# Patient Record
Sex: FEMALE | Race: OTHER | URBAN - METROPOLITAN AREA
[De-identification: names, ages, dates, MRNs, and addresses within clinical notes are randomized per-mention and may not be internally consistent; named-entity substitution may affect disease eponyms.]

---

## 2024-09-06 ENCOUNTER — INPATIENT (INPATIENT)
Facility: HOSPITAL | Age: 79
LOS: 1 days | Discharge: HOME | DRG: 203 | End: 2024-09-08
Attending: STUDENT IN AN ORGANIZED HEALTH CARE EDUCATION/TRAINING PROGRAM | Admitting: STUDENT IN AN ORGANIZED HEALTH CARE EDUCATION/TRAINING PROGRAM
Payer: MEDICAID

## 2024-09-06 VITALS — HEIGHT: 60 IN | WEIGHT: 164.91 LBS

## 2024-09-06 DIAGNOSIS — R07.9 CHEST PAIN, UNSPECIFIED: ICD-10-CM

## 2024-09-06 DIAGNOSIS — Z98.890 OTHER SPECIFIED POSTPROCEDURAL STATES: Chronic | ICD-10-CM

## 2024-09-06 LAB
ALBUMIN SERPL ELPH-MCNC: 3.6 G/DL — SIGNIFICANT CHANGE UP (ref 3.3–5)
ALP SERPL-CCNC: 75 U/L — SIGNIFICANT CHANGE UP (ref 40–120)
ALT FLD-CCNC: 30 U/L — SIGNIFICANT CHANGE UP (ref 12–78)
ANION GAP SERPL CALC-SCNC: 9 MMOL/L — SIGNIFICANT CHANGE UP (ref 5–17)
AST SERPL-CCNC: 53 U/L — HIGH (ref 15–37)
BASOPHILS # BLD AUTO: 0 K/UL — SIGNIFICANT CHANGE UP (ref 0–0.2)
BASOPHILS NFR BLD AUTO: 0 % — SIGNIFICANT CHANGE UP (ref 0–2)
BILIRUB SERPL-MCNC: 0.7 MG/DL — SIGNIFICANT CHANGE UP (ref 0.2–1.2)
BUN SERPL-MCNC: 11 MG/DL — SIGNIFICANT CHANGE UP (ref 7–23)
CALCIUM SERPL-MCNC: 9.5 MG/DL — SIGNIFICANT CHANGE UP (ref 8.5–10.1)
CHLORIDE SERPL-SCNC: 105 MMOL/L — SIGNIFICANT CHANGE UP (ref 96–108)
CO2 SERPL-SCNC: 20 MMOL/L — LOW (ref 22–31)
CREAT SERPL-MCNC: 0.74 MG/DL — SIGNIFICANT CHANGE UP (ref 0.5–1.3)
EGFR: 83 ML/MIN/1.73M2 — SIGNIFICANT CHANGE UP
EOSINOPHIL # BLD AUTO: 0.25 K/UL — SIGNIFICANT CHANGE UP (ref 0–0.5)
EOSINOPHIL NFR BLD AUTO: 2 % — SIGNIFICANT CHANGE UP (ref 0–6)
GLUCOSE SERPL-MCNC: 145 MG/DL — HIGH (ref 70–99)
HCT VFR BLD CALC: 39.7 % — SIGNIFICANT CHANGE UP (ref 34.5–45)
HGB BLD-MCNC: 12.7 G/DL — SIGNIFICANT CHANGE UP (ref 11.5–15.5)
LIDOCAIN IGE QN: 27 U/L — SIGNIFICANT CHANGE UP (ref 13–75)
LYMPHOCYTES # BLD AUTO: 37 % — SIGNIFICANT CHANGE UP (ref 13–44)
LYMPHOCYTES # BLD AUTO: 4.58 K/UL — HIGH (ref 1–3.3)
MAGNESIUM SERPL-MCNC: 2.1 MG/DL — SIGNIFICANT CHANGE UP (ref 1.6–2.6)
MANUAL SMEAR VERIFICATION: SIGNIFICANT CHANGE UP
MCHC RBC-ENTMCNC: 26.7 PG — LOW (ref 27–34)
MCHC RBC-ENTMCNC: 32 GM/DL — SIGNIFICANT CHANGE UP (ref 32–36)
MCV RBC AUTO: 83.4 FL — SIGNIFICANT CHANGE UP (ref 80–100)
MONOCYTES # BLD AUTO: 0.87 K/UL — SIGNIFICANT CHANGE UP (ref 0–0.9)
MONOCYTES NFR BLD AUTO: 7 % — SIGNIFICANT CHANGE UP (ref 2–14)
NEUTROPHILS # BLD AUTO: 6.68 K/UL — SIGNIFICANT CHANGE UP (ref 1.8–7.4)
NEUTROPHILS NFR BLD AUTO: 53 % — SIGNIFICANT CHANGE UP (ref 43–77)
NEUTS BAND # BLD: 1 % — SIGNIFICANT CHANGE UP (ref 0–8)
NRBC # BLD: 0 /100 WBCS — SIGNIFICANT CHANGE UP (ref 0–0)
NRBC # BLD: SIGNIFICANT CHANGE UP /100 WBCS (ref 0–0)
PLAT MORPH BLD: NORMAL — SIGNIFICANT CHANGE UP
PLATELET # BLD AUTO: 469 K/UL — HIGH (ref 150–400)
POTASSIUM SERPL-MCNC: 5 MMOL/L — SIGNIFICANT CHANGE UP (ref 3.5–5.3)
POTASSIUM SERPL-SCNC: 5 MMOL/L — SIGNIFICANT CHANGE UP (ref 3.5–5.3)
PROT SERPL-MCNC: 8.2 GM/DL — SIGNIFICANT CHANGE UP (ref 6–8.3)
RBC # BLD: 4.76 M/UL — SIGNIFICANT CHANGE UP (ref 3.8–5.2)
RBC # FLD: 13.6 % — SIGNIFICANT CHANGE UP (ref 10.3–14.5)
RBC BLD AUTO: NORMAL — SIGNIFICANT CHANGE UP
SODIUM SERPL-SCNC: 134 MMOL/L — LOW (ref 135–145)
TROPONIN I, HIGH SENSITIVITY RESULT: 77.63 NG/L — HIGH
TROPONIN I, HIGH SENSITIVITY RESULT: 91.9 NG/L — HIGH
WBC # BLD: 12.37 K/UL — HIGH (ref 3.8–10.5)
WBC # FLD AUTO: 12.37 K/UL — HIGH (ref 3.8–10.5)

## 2024-09-06 PROCEDURE — 80061 LIPID PANEL: CPT

## 2024-09-06 PROCEDURE — 93010 ELECTROCARDIOGRAM REPORT: CPT

## 2024-09-06 PROCEDURE — 93005 ELECTROCARDIOGRAM TRACING: CPT

## 2024-09-06 PROCEDURE — 84484 ASSAY OF TROPONIN QUANT: CPT

## 2024-09-06 PROCEDURE — 99285 EMERGENCY DEPT VISIT HI MDM: CPT

## 2024-09-06 PROCEDURE — 93306 TTE W/DOPPLER COMPLETE: CPT

## 2024-09-06 PROCEDURE — 85027 COMPLETE CBC AUTOMATED: CPT

## 2024-09-06 PROCEDURE — 76376 3D RENDER W/INTRP POSTPROCES: CPT

## 2024-09-06 PROCEDURE — 80048 BASIC METABOLIC PNL TOTAL CA: CPT

## 2024-09-06 PROCEDURE — 36415 COLL VENOUS BLD VENIPUNCTURE: CPT

## 2024-09-06 PROCEDURE — 71046 X-RAY EXAM CHEST 2 VIEWS: CPT | Mod: 26

## 2024-09-06 PROCEDURE — 83036 HEMOGLOBIN GLYCOSYLATED A1C: CPT

## 2024-09-06 PROCEDURE — 71046 X-RAY EXAM CHEST 2 VIEWS: CPT

## 2024-09-06 RX ORDER — FAMOTIDINE 10 MG/ML
20 INJECTION INTRAVENOUS ONCE
Refills: 0 | Status: COMPLETED | OUTPATIENT
Start: 2024-09-06 | End: 2024-09-06

## 2024-09-06 RX ORDER — METFORMIN HYDROCHLORIDE 850 MG/1
1 TABLET, FILM COATED ORAL
Refills: 0 | DISCHARGE

## 2024-09-06 RX ORDER — MAGNESIUM, ALUMINUM HYDROXIDE 200-225/5
30 SUSPENSION, ORAL (FINAL DOSE FORM) ORAL ONCE
Refills: 0 | Status: COMPLETED | OUTPATIENT
Start: 2024-09-06 | End: 2024-09-06

## 2024-09-06 RX ADMIN — Medication 30 MILLILITER(S): at 21:11

## 2024-09-06 RX ADMIN — FAMOTIDINE 20 MILLIGRAM(S): 10 INJECTION INTRAVENOUS at 21:11

## 2024-09-06 NOTE — ED STATDOCS - PROGRESS NOTE DETAILS
Labs and imaging reviewed. Troponin 91. No other actionable findings. CXR NAD. Patient and family at bedside made aware. Will admit to telemetry. Patient agreeable to admission. - Lis Rivera PA-C Troponin 91. No other actionable findings.  Patient and family at bedside made aware. Will admit to telemetry. Patient agreeable to admission. - Lis Rivera PA-C

## 2024-09-06 NOTE — ED ADULT TRIAGE NOTE - CHIEF COMPLAINT QUOTE
Pt ambulatory to ER c/o upper abdominal pain/chest pain, dizziness x 1 month. Pt states she feels a "burning sensation" to chest. Pt also endorses right hip pain for 1 month and vomiting today x2. Denies any other complaints at this time. No acute distress noted. As per patient, no PMH. NKDA. A&Ox4. BEFAST negative. Pt taken for STAT EKG.

## 2024-09-06 NOTE — ED STATDOCS - OBJECTIVE STATEMENT
80 yo female w/PMHx DM, HTN, HLD, hernia, presents to the ED with multiple medical complaints. Pt in ED c/o burning abdominal pain x1 month. Per chart review, was seen at [facility] in August for similar complaints. Has been taking Acetominophen for pain.   [id number] [id number]    used, id# [id number] 80 yo female w/PMHx DM, HTN, HLD, hernia, presents to the ED with multiple medical complaints. Pt in ED c/o burning abdominal pain radiating up into her chest x1 month. Per chart review, was seen at [facility] in August for similar complaints. Has been taking Acetominophen for pain. Pt in ED also endorsing n/v x2 days, and states she is having back pain as well. No saddle anesthesia reported.  used, id# [id number] 78 yo female w/PMHx DM, HTN, HLD, hernia, presents to the ED with multiple medical complaints. Pt in ED c/o burning abdominal pain radiating up into her chest x1 month. Per chart review, was seen at [facility] in August for similar complaints, and was dx with gastritis. Has been taking Acetominophen for pain. Pt in ED also endorsing n/v x2 days, and states she is having back pain as well. No saddle anesthesia reported.  used, id# [id number]

## 2024-09-06 NOTE — ED STATDOCS - NSICDXPASTMEDICALHX_GEN_ALL_CORE_FT
PAST MEDICAL HISTORY:  DM (diabetes mellitus)     Hernia     HLD (hyperlipidemia)     HTN (hypertension)

## 2024-09-06 NOTE — ED STATDOCS - PHYSICAL EXAMINATION
GEN: Patient awake alert NAD.   HEENT: normocephalic, atraumatic, EOMI, no scleral icterus, moist MM  CARDIAC: RRR, S1, S2, no murmur.   PULM: CTA B/L no wheeze, rhonchi, rales.   ABD: soft NT, ND, no rebound no guarding, no CVA tenderness.   MSK: Moving all extremities, no edema. 5/5 strength and full ROM in all extremities.     NEURO: A&Ox3, gait normal, no focal neurological deficits, CN 2-12 grossly intact  SKIN: warm, dry, no rash. 3C-19A/Telemetry GEN: Patient awake alert NAD.   HEENT: normocephalic, atraumatic, EOMI, no scleral icterus, moist MM  CARDIAC: RRR, S1, S2, no murmur.   PULM: CTA B/L no wheeze, rhonchi, rales.   ABD: soft, epigastric ttp, ND, no rebound no guarding, no CVA tenderness.   MSK: Moving all extremities, no edema. 5/5 strength and full ROM in all extremities.     NEURO: A&Ox3, gait normal, no focal neurological deficits,  SKIN: warm, dry, no rash.

## 2024-09-06 NOTE — PHARMACOTHERAPY INTERVENTION NOTE - COMMENTS
Medication history complete, reviewed medications with patient son  and confirmed with doctor first med hx.

## 2024-09-06 NOTE — ED STATDOCS - NSICDXFAMILYHX_GEN_ALL_CORE_FT
FAMILY HISTORY:  Father  Still living? No  FHx: ischemic heart disease, Age at diagnosis: Age Unknown

## 2024-09-06 NOTE — ED STATDOCS - CLINICAL SUMMARY MEDICAL DECISION MAKING FREE TEXT BOX
Rowdy Harrington DO (Attending): 78 yo female w/PMHx DM, HTN, HLD, hernia, presents to the ED with multiple medical complaints. Pt in ED c/o burning abdominal pain radiating up into her chest x1 month. Per chart review, was seen at [facility] in August for similar complaints, and was dx with gastritis. Has been taking Acetominophen for pain. Pt in ED also endorsing n/v x2 days, and states she is having back pain as well. No saddle anesthesia reported. Patient well-appearing, hemodynamically stable, epigastric tenderness.  Differential clues not limited to GERD, doubt acute biliary pathology, pancreatitis, low suspicion for atypical ACS.  Plan for labs, EKG, telemetry, chest x-ray, pain control.  Reassess

## 2024-09-07 LAB
A1C WITH ESTIMATED AVERAGE GLUCOSE RESULT: 7.8 % — HIGH (ref 4–5.6)
ANION GAP SERPL CALC-SCNC: 8 MMOL/L — SIGNIFICANT CHANGE UP (ref 5–17)
BUN SERPL-MCNC: 9 MG/DL — SIGNIFICANT CHANGE UP (ref 7–23)
CALCIUM SERPL-MCNC: 9 MG/DL — SIGNIFICANT CHANGE UP (ref 8.5–10.1)
CHLORIDE SERPL-SCNC: 106 MMOL/L — SIGNIFICANT CHANGE UP (ref 96–108)
CHOLEST SERPL-MCNC: 283 MG/DL — HIGH
CO2 SERPL-SCNC: 23 MMOL/L — SIGNIFICANT CHANGE UP (ref 22–31)
CREAT SERPL-MCNC: 0.62 MG/DL — SIGNIFICANT CHANGE UP (ref 0.5–1.3)
EGFR: 92 ML/MIN/1.73M2 — SIGNIFICANT CHANGE UP
ESTIMATED AVERAGE GLUCOSE: 177 MG/DL — HIGH (ref 68–114)
GLUCOSE SERPL-MCNC: 144 MG/DL — HIGH (ref 70–99)
HCT VFR BLD CALC: 37.1 % — SIGNIFICANT CHANGE UP (ref 34.5–45)
HDLC SERPL-MCNC: 44 MG/DL — LOW
HGB BLD-MCNC: 12 G/DL — SIGNIFICANT CHANGE UP (ref 11.5–15.5)
LIPID PNL WITH DIRECT LDL SERPL: 195 MG/DL — HIGH
MCHC RBC-ENTMCNC: 27 PG — SIGNIFICANT CHANGE UP (ref 27–34)
MCHC RBC-ENTMCNC: 32.3 GM/DL — SIGNIFICANT CHANGE UP (ref 32–36)
MCV RBC AUTO: 83.4 FL — SIGNIFICANT CHANGE UP (ref 80–100)
NON HDL CHOLESTEROL: 240 MG/DL — HIGH
PLATELET # BLD AUTO: 433 K/UL — HIGH (ref 150–400)
POTASSIUM SERPL-MCNC: 3.7 MMOL/L — SIGNIFICANT CHANGE UP (ref 3.5–5.3)
POTASSIUM SERPL-SCNC: 3.7 MMOL/L — SIGNIFICANT CHANGE UP (ref 3.5–5.3)
RBC # BLD: 4.45 M/UL — SIGNIFICANT CHANGE UP (ref 3.8–5.2)
RBC # FLD: 13.6 % — SIGNIFICANT CHANGE UP (ref 10.3–14.5)
SODIUM SERPL-SCNC: 137 MMOL/L — SIGNIFICANT CHANGE UP (ref 135–145)
TRIGL SERPL-MCNC: 231 MG/DL — HIGH
WBC # BLD: 9.48 K/UL — SIGNIFICANT CHANGE UP (ref 3.8–10.5)
WBC # FLD AUTO: 9.48 K/UL — SIGNIFICANT CHANGE UP (ref 3.8–10.5)

## 2024-09-07 PROCEDURE — 99223 1ST HOSP IP/OBS HIGH 75: CPT

## 2024-09-07 PROCEDURE — 93306 TTE W/DOPPLER COMPLETE: CPT | Mod: 26

## 2024-09-07 PROCEDURE — 76376 3D RENDER W/INTRP POSTPROCES: CPT | Mod: 26

## 2024-09-07 PROCEDURE — 93010 ELECTROCARDIOGRAM REPORT: CPT

## 2024-09-07 RX ORDER — MAGNESIUM, ALUMINUM HYDROXIDE 200-225/5
30 SUSPENSION, ORAL (FINAL DOSE FORM) ORAL EVERY 4 HOURS
Refills: 0 | Status: DISCONTINUED | OUTPATIENT
Start: 2024-09-07 | End: 2024-09-08

## 2024-09-07 RX ORDER — ENOXAPARIN SODIUM 100 MG/ML
40 INJECTION SUBCUTANEOUS EVERY 24 HOURS
Refills: 0 | Status: DISCONTINUED | OUTPATIENT
Start: 2024-09-07 | End: 2024-09-08

## 2024-09-07 RX ORDER — METFORMIN HYDROCHLORIDE 850 MG/1
500 TABLET, FILM COATED ORAL
Refills: 0 | Status: DISCONTINUED | OUTPATIENT
Start: 2024-09-07 | End: 2024-09-08

## 2024-09-07 RX ORDER — ONDANSETRON 2 MG/ML
4 INJECTION, SOLUTION INTRAMUSCULAR; INTRAVENOUS EVERY 8 HOURS
Refills: 0 | Status: DISCONTINUED | OUTPATIENT
Start: 2024-09-07 | End: 2024-09-08

## 2024-09-07 RX ORDER — KETOROLAC TROMETHAMINE 30 MG/ML
15 INJECTION, SOLUTION INTRAMUSCULAR EVERY 8 HOURS
Refills: 0 | Status: DISCONTINUED | OUTPATIENT
Start: 2024-09-07 | End: 2024-09-08

## 2024-09-07 RX ORDER — ACETAMINOPHEN 325 MG/1
650 TABLET ORAL EVERY 6 HOURS
Refills: 0 | Status: DISCONTINUED | OUTPATIENT
Start: 2024-09-07 | End: 2024-09-08

## 2024-09-07 RX ORDER — PANTOPRAZOLE SODIUM 40 MG
40 TABLET, DELAYED RELEASE (ENTERIC COATED) ORAL
Refills: 0 | Status: DISCONTINUED | OUTPATIENT
Start: 2024-09-07 | End: 2024-09-08

## 2024-09-07 RX ORDER — LOSARTAN POTASSIUM 50 MG/1
50 TABLET ORAL DAILY
Refills: 0 | Status: DISCONTINUED | OUTPATIENT
Start: 2024-09-07 | End: 2024-09-08

## 2024-09-07 RX ADMIN — Medication 40 MILLIGRAM(S): at 06:15

## 2024-09-07 RX ADMIN — ENOXAPARIN SODIUM 40 MILLIGRAM(S): 100 INJECTION SUBCUTANEOUS at 06:15

## 2024-09-07 RX ADMIN — KETOROLAC TROMETHAMINE 15 MILLIGRAM(S): 30 INJECTION, SOLUTION INTRAMUSCULAR at 21:29

## 2024-09-07 RX ADMIN — METFORMIN HYDROCHLORIDE 500 MILLIGRAM(S): 850 TABLET, FILM COATED ORAL at 21:29

## 2024-09-07 RX ADMIN — METFORMIN HYDROCHLORIDE 500 MILLIGRAM(S): 850 TABLET, FILM COATED ORAL at 10:00

## 2024-09-07 RX ADMIN — KETOROLAC TROMETHAMINE 15 MILLIGRAM(S): 30 INJECTION, SOLUTION INTRAMUSCULAR at 16:48

## 2024-09-07 RX ADMIN — LOSARTAN POTASSIUM 50 MILLIGRAM(S): 50 TABLET ORAL at 10:00

## 2024-09-07 NOTE — PROGRESS NOTE ADULT - ASSESSMENT
80 y/o female presents with chest pain:    #Chest pain likely GI  EKG wnl  Trop x2 negative  MAALOX given in ED which relieved the pain  patient has indomethacin in chart but is not currently taking it, likely was prescribed for gout but patient can't recall  Patient does not smoke or drink ETOH  Unlikely to be cardiac in nature but patient has risk factors  f/u TTE  f/u lipid panel  c/w protonix     #Hx of HTN  c/w home meds    #Hx of DM  c/w home meds  f/u a1c    DVT: Lovenox  Disposition: Discharge tomorrow if symptoms stable

## 2024-09-07 NOTE — H&P ADULT - ASSESSMENT
78 y/o female presents with chest pain:    #Chest pain likely GI  EKG wnl  Trop x2 negative  MAALOX given in ED which relieved the pain  patient has indomethacin in chart but is not currently taking it, likely was prescribed for gout but patient can't recall  Patient does not smoke or drink ETOH  Unlikely to be cardiac in nature but patient has risk factors  f/u TTE  f/u lipid panel  c/w protonix     #Hx of HTN  c/w home meds    #Hx of DM  c/w home meds  f/u a1c    DVT: Lovenox  Disposition: Home within 48 hours

## 2024-09-07 NOTE — H&P ADULT - HISTORY OF PRESENT ILLNESS
78 y/o female with a PMHx of HTN and DM presents to the ED with chest pain. She deferred HPI to her son at bedside. Son states that she has been feeling chest pressure on and off for 3 weeks. She went to the hospital during that time and was diagnosed with gastritis, cardio workup was wnl as per son. She is unable to associate the pain with any factors and nothing relieves the pain. She also has SOB during these times but no ANGUIANO or orthopnea. Denies any n/v/d/c, headache, focal neurological deficits.

## 2024-09-07 NOTE — PROGRESS NOTE ADULT - SUBJECTIVE AND OBJECTIVE BOX
HOSPITALIST PROGRESS NOTE    SUBJECTIVE / INTERVAL HPI: Patient seen and examined at bedside.  used. Patient still with chest pain - similar to admission. No SOB/palpitations     ROS: All 10 systems reviewed and found to be negative with the exception of what has been described above.     VITAL SIGNS:  Vital Signs Last 24 Hrs  T(C): 37 (07 Sep 2024 15:34), Max: 37 (06 Sep 2024 19:21)  T(F): 98.6 (07 Sep 2024 15:34), Max: 98.6 (06 Sep 2024 19:21)  HR: 89 (07 Sep 2024 15:34) (77 - 107)  BP: 121/57 (07 Sep 2024 15:34) (112/77 - 149/52)  BP(mean): 73 (06 Sep 2024 23:45) (73 - 101)  RR: 18 (07 Sep 2024 15:34) (16 - 18)  SpO2: 93% (07 Sep 2024 15:34) (92% - 96%)    Parameters below as of 07 Sep 2024 15:34  Patient On (Oxygen Delivery Method): room air        PHYSICAL EXAM:    General: No acute distress  HEENT: NC/AT; PERRL, anicteric sclera; MMM  Neck: Supple  Cardiovascular: +S1/S2, RRR, no murmurs, rubs, gallops  Respiratory: CTA B/L; no W/R/R  Gastrointestinal: soft, NT/ND; +BSx4  MSK: TTP around chest wall  Extremities: WWP; no edema, clubbing or cyanosis  Vascular: 2+ radial, DP/PT pulses B/L  Neurological: AAOx3; no focal deficits    MEDICATIONS:  MEDICATIONS  (STANDING):  enoxaparin Injectable 40 milliGRAM(s) SubCutaneous every 24 hours  losartan 50 milliGRAM(s) Oral daily  metFORMIN 500 milliGRAM(s) Oral two times a day  pantoprazole    Tablet 40 milliGRAM(s) Oral before breakfast    MEDICATIONS  (PRN):  acetaminophen     Tablet .. 650 milliGRAM(s) Oral every 6 hours PRN Temp greater or equal to 38C (100.4F), Mild Pain (1 - 3)  aluminum hydroxide/magnesium hydroxide/simethicone Suspension 30 milliLiter(s) Oral every 4 hours PRN Dyspepsia  melatonin 3 milliGRAM(s) Oral at bedtime PRN Insomnia  ondansetron Injectable 4 milliGRAM(s) IV Push every 8 hours PRN Nausea and/or Vomiting      ALLERGIES:  Allergies    No Known Allergies    Intolerances        LABS:                        12.0   9.48  )-----------( 433      ( 07 Sep 2024 07:59 )             37.1     09-07    137  |  106  |  9   ----------------------------<  144<H>  3.7   |  23  |  0.62    Ca    9.0      07 Sep 2024 07:59  Mg     2.1     09-06    TPro  8.2  /  Alb  3.6  /  TBili  0.7  /  DBili  x   /  AST  53<H>  /  ALT  30  /  AlkPhos  75  09-06      Urinalysis Basic - ( 07 Sep 2024 07:59 )    Color: x / Appearance: x / SG: x / pH: x  Gluc: 144 mg/dL / Ketone: x  / Bili: x / Urobili: x   Blood: x / Protein: x / Nitrite: x   Leuk Esterase: x / RBC: x / WBC x   Sq Epi: x / Non Sq Epi: x / Bacteria: x      CAPILLARY BLOOD GLUCOSE            RADIOLOGY & ADDITIONAL TESTS: Reviewed.

## 2024-09-07 NOTE — PATIENT PROFILE ADULT - ARE SIGNIFICANT INDICATORS COMPLETE.
hx, exam, I & D performed, augmentin/bactrim ds prescribed pt is advised to follow up with pmd and return if symptoms persist or worsen. Yes

## 2024-09-08 VITALS
RESPIRATION RATE: 18 BRPM | SYSTOLIC BLOOD PRESSURE: 123 MMHG | TEMPERATURE: 98 F | HEART RATE: 69 BPM | OXYGEN SATURATION: 98 % | DIASTOLIC BLOOD PRESSURE: 68 MMHG

## 2024-09-08 PROCEDURE — 99239 HOSP IP/OBS DSCHRG MGMT >30: CPT

## 2024-09-08 RX ORDER — IBUPROFEN 600 MG
1 TABLET ORAL
Qty: 42 | Refills: 0
Start: 2024-09-08 | End: 2024-09-21

## 2024-09-08 RX ORDER — PANTOPRAZOLE SODIUM 40 MG
1 TABLET, DELAYED RELEASE (ENTERIC COATED) ORAL
Qty: 30 | Refills: 0
Start: 2024-09-08 | End: 2024-10-07

## 2024-09-08 RX ADMIN — ENOXAPARIN SODIUM 40 MILLIGRAM(S): 100 INJECTION SUBCUTANEOUS at 06:03

## 2024-09-08 RX ADMIN — METFORMIN HYDROCHLORIDE 500 MILLIGRAM(S): 850 TABLET, FILM COATED ORAL at 09:05

## 2024-09-08 RX ADMIN — Medication 40 MILLIGRAM(S): at 06:03

## 2024-09-08 RX ADMIN — LOSARTAN POTASSIUM 50 MILLIGRAM(S): 50 TABLET ORAL at 09:04

## 2024-09-08 RX ADMIN — KETOROLAC TROMETHAMINE 15 MILLIGRAM(S): 30 INJECTION, SOLUTION INTRAMUSCULAR at 06:03

## 2024-09-08 NOTE — DISCHARGE NOTE PROVIDER - NSDCCPTREATMENT_GEN_ALL_CORE_FT
PRINCIPAL PROCEDURE  Procedure: Transthoracic echocardiography (TTE)  Findings and Treatment: Left Ventricle:  Left ventricular wall thickness is normal. Left ventricular systolic function is normal with a calculated ejection fraction of 55 % by 3D with an ejection fraction visually estimated at 55 to 60%. There is normal left ventriculardiastolic function.     Right Ventricle:  The right ventricular cavity is normal in size, with normal wall thickness and right ventricular systolic function is normal. Tricuspid annular plane systolic excursion (TAPSE) is 1.7 cm (normal >=1.7 cm).     Left Atrium:  The left atrium is normal in size with an indexed volume of 22.62 ml/m².     Right Atrium:  The right atrium is normal in size.     Interatrial Septum:  The interatrial septum appears intact.     Aortic Valve:  The aortic valve appearstrileaflet with normal systolic excursion. There is fibrocalcific aortic valve sclerosis without stenosis. The peak transaortic velocity is 1.30 m/s and peak transaortic gradient is 6.8 mmHg.     Mitral Valve:  There is mitral valve thickening of theanterior and posterior leaflets. There is calcification of the mitral valve annulus. There is no mitral valve stenosis. There is mild mitral regurgitation. Mitral inflow is A-wave dominant.     Tricuspid Valve:  Structurally normal tricuspid valve with normal leaflet excursion. There is trace tricuspid regurgitation.     Pulmonic Valve:  Structurally normal pulmonic valve with normal leaflet excursion. There is no evidence of pulmonic regurgitation.     Aorta:  The aortic root at the sinuses of Valsalva is normal in size, measuring 2.80 cm (indexed 1.63 cm/m²). The ascending aorta is normal in size, measuring 2.89 cm (indexed 1.68 cm/m²).     Pericardium:  No pericardial effusion seen

## 2024-09-08 NOTE — DISCHARGE NOTE PROVIDER - NSDCMRMEDTOKEN_GEN_ALL_CORE_FT
losartan 50 mg oral tablet: 1 tab(s) orally once a day  metFORMIN 500 mg oral tablet: 1 tab(s) orally 2 times a day  Multiple Vitamins oral tablet: 1 tab(s) orally once a day   atorvastatin 40 mg oral tablet: 1 tab(s) orally once a day (at bedtime)  ibuprofen 600 mg oral tablet: 1 tab(s) orally every 8 hours as needed for  moderate pain  losartan 50 mg oral tablet: 1 tab(s) orally once a day  metFORMIN 500 mg oral tablet: 1 tab(s) orally 2 times a day  Multiple Vitamins oral tablet: 1 tab(s) orally once a day  pantoprazole 40 mg oral delayed release tablet: 1 tab(s) orally once a day (before a meal)

## 2024-09-08 NOTE — DISCHARGE NOTE PROVIDER - HOSPITAL COURSE
#Discharge: do not delete    Patient is __ yo M/F with past medical history of _____ presented with _____, found to have _____ (one liner)    Hospital course (by problem):     Patient was discharged to: (home/REFUGIO/acute rehab/hospice, etc, and with what services – home health PT/RN? Home O2?)    New medications:   Changes to old medications:  Medications that were stopped:    Items to follow up as outpatient:    Physical exam at the time of discharge:       Admission HPI: 78 y/o female with a PMHx of HTN and DM presents to the ED with chest pain. She deferred HPI to her son at bedside. Son states that she has been feeling chest pressure on and off for 3 weeks. She went to the hospital during that time and was diagnosed with gastritis, cardio workup was wnl as per son. She is unable to associate the pain with any factors and nothing relieves the pain. She also has SOB during these times but no ANGUIANO or orthopnea. Denies any n/v/d/c, headache, focal neurological deficits.     9/8: Patient seen and examined. Reports CP is better with Toradol. Still mildly tender to palpitation. Stable for d/c home.     Hospital course (by problem):   #Chest pain likely GI vs MSK  EKG wnl  Trop x2 mildly elevated, could be in setting of tachycardia on admission from pain  MAALOX given in ED which relieved the pain  Toradol started which also helped with pain   Patient does not smoke or drink ETOH  TTE no acute findings  C/w Protonix   C/w Ibuprofen/Tylenol as needed  Outpatient Cardiology routine follow up    #HLD  Start statin, lifestyle modifications     #Hx of HTN  c/w home meds    #Hx of DM  c/w home meds  a1c 7.8    Patient was discharged to: Home   Physical exam at the time of discharge:  LOS: 2d    VITALS:   T(C): 36.8 (09-08-24 @ 08:42), Max: 37 (09-07-24 @ 15:34)  HR: 69 (09-08-24 @ 08:42) (69 - 99)  BP: 123/68 (09-08-24 @ 08:42) (106/58 - 127/60)  RR: 18 (09-08-24 @ 08:42) (18 - 18)  SpO2: 98% (09-08-24 @ 08:42) (92% - 98%)    PHYSICAL EXAM:  General: No acute distress  HEENT: NC/AT; PERRL, anicteric sclera; MMM  Neck: Supple  Cardiovascular: +S1/S2, RRR, no murmurs, rubs, gallops  Respiratory: CTA B/L; no W/R/R  Gastrointestinal: soft, NT/ND; +BSx4  MSK: TTP around chest wall  Extremities: WWP; no edema, clubbing or cyanosis  Vascular: 2+ radial, DP/PT pulses B/L  Neurological: AAOx3; no focal deficits

## 2024-09-08 NOTE — DISCHARGE NOTE NURSING/CASE MANAGEMENT/SOCIAL WORK - PATIENT PORTAL LINK FT
You can access the FollowMyHealth Patient Portal offered by [facility] by registering at the following website: [email or web address]. By joining [employer]’s FollowMyHealth portal, you will also be able to view your health information using other applications (apps) compatible with our system.

## 2024-09-08 NOTE — DISCHARGE NOTE PROVIDER - NSDCCPCAREPLAN_GEN_ALL_CORE_FT
PRINCIPAL DISCHARGE DIAGNOSIS  Diagnosis: Acute chest pain  Assessment and Plan of Treatment: Your chest pain is likely related to muscle pain. Continue Ibuprofen 600 mg every 8 hours as needed for pain for the next 1-2 weeks.  We recommend routine outpatient follow up with the Cardiologist for further testing including a stress test and management of your blood pressure.      SECONDARY DISCHARGE DIAGNOSES  Diagnosis: Hyperlipidemia  Assessment and Plan of Treatment: You have high cholesterol. Cholesterol is a substance that is found in the blood. Everyone has some. It is needed for good health. The problem is, people sometimes have too much cholesterol. Compared with people with normal cholesterol, people with high cholesterol have a higher risk of heart attacks, strokes, and other health problems. The higher your cholesterol, the higher your risk of these problems. It is important to take your medications for high cholesterol as prescribed to prevent the complications of this condition.    Diagnosis: Diabetes mellitus  Assessment and Plan of Treatment: You have a diagnosis of type 2 diabetes (sometimes called type 2 "diabetes mellitus"). This is a disorder that disrupts the way your body uses sugar. All the cells in your body need sugar to work normally. Sugar gets into the cells with the help of a hormone called insulin. If there is not enough insulin, or if the body stops responding to insulin, sugar builds up in the blood. That is what happens to people with diabetes. Type 2 diabetes usually causes no symptoms. When symptoms do occur, they include the need to urinate often, intense thirst and blurry vision. Even though type 2 diabetes might not make you feel sick, it can cause serious problems over time, if it is not treated. The disorder can lead to heart attacks, strokes, kidney disease, vision problems (or even blindness), pain or loss of feeling in the hands and feet, and the need to have fingers, toes, or other body parts removed (amputated). In addition to maintaining an active lifestyle, losing weight, eating right, and not smoking it is important to take your diabetes medications as directed to control your blood sugar and prevent the possible complications from this disease.

## 2024-09-16 DIAGNOSIS — E78.5 HYPERLIPIDEMIA, UNSPECIFIED: ICD-10-CM

## 2024-09-16 DIAGNOSIS — I10 ESSENTIAL (PRIMARY) HYPERTENSION: ICD-10-CM

## 2024-09-16 DIAGNOSIS — E11.9 TYPE 2 DIABETES MELLITUS WITHOUT COMPLICATIONS: ICD-10-CM

## 2024-09-16 DIAGNOSIS — Z79.84 LONG TERM (CURRENT) USE OF ORAL HYPOGLYCEMIC DRUGS: ICD-10-CM

## 2024-09-16 DIAGNOSIS — M79.18 MYALGIA, OTHER SITE: ICD-10-CM
